# Patient Record
Sex: FEMALE | Race: WHITE | NOT HISPANIC OR LATINO | Employment: UNEMPLOYED | URBAN - METROPOLITAN AREA
[De-identification: names, ages, dates, MRNs, and addresses within clinical notes are randomized per-mention and may not be internally consistent; named-entity substitution may affect disease eponyms.]

---

## 2019-12-30 ENCOUNTER — AMBULATORY - HEALTHEAST (OUTPATIENT)
Dept: MATERNAL FETAL MEDICINE | Facility: HOSPITAL | Age: 34
End: 2019-12-30

## 2019-12-30 DIAGNOSIS — O26.90 PREGNANCY, ANTEPARTUM, COMPLICATIONS: ICD-10-CM

## 2019-12-31 ENCOUNTER — RECORDS - HEALTHEAST (OUTPATIENT)
Dept: ADMINISTRATIVE | Facility: OTHER | Age: 34
End: 2019-12-31

## 2020-01-02 ENCOUNTER — OFFICE VISIT - HEALTHEAST (OUTPATIENT)
Dept: CARDIOLOGY | Facility: CLINIC | Age: 35
End: 2020-01-02

## 2020-01-02 ENCOUNTER — AMBULATORY - HEALTHEAST (OUTPATIENT)
Dept: MATERNAL FETAL MEDICINE | Facility: HOSPITAL | Age: 35
End: 2020-01-02

## 2020-01-02 DIAGNOSIS — R55 VASOVAGAL SYNCOPE: ICD-10-CM

## 2020-01-02 RX ORDER — ACETAMINOPHEN 500 MG
1000 TABLET ORAL EVERY 6 HOURS PRN
Status: SHIPPED | COMMUNITY
Start: 2019-12-18

## 2020-01-02 ASSESSMENT — MIFFLIN-ST. JEOR: SCORE: 1666.58

## 2020-01-06 ENCOUNTER — OFFICE VISIT - HEALTHEAST (OUTPATIENT)
Dept: MATERNAL FETAL MEDICINE | Facility: HOSPITAL | Age: 35
End: 2020-01-06

## 2020-01-06 DIAGNOSIS — O26.90 PREGNANCY, ANTEPARTUM, COMPLICATIONS: ICD-10-CM

## 2020-01-06 DIAGNOSIS — O36.1110 ISOIMMUNIZATION FROM BLOOD GROUP INCOMPATIBILITY DURING PREGNANCY IN FIRST TRIMESTER, SINGLE OR UNSPECIFIED FETUS: ICD-10-CM

## 2020-01-06 DIAGNOSIS — Z82.79 FAMILY HISTORY OF CONGENITAL HEART DISORDER IN BROTHER: ICD-10-CM

## 2020-01-17 ENCOUNTER — HOSPITAL ENCOUNTER (OUTPATIENT)
Dept: CARDIOLOGY | Facility: HOSPITAL | Age: 35
Discharge: HOME | End: 2020-01-17
Attending: INTERNAL MEDICINE

## 2020-01-17 DIAGNOSIS — R55 VASOVAGAL SYNCOPE: ICD-10-CM

## 2020-01-17 LAB
AORTIC ROOT: 3 CM
AORTIC VALVE MEAN VELOCITY: 70 CM/S
AV DIMENSIONLESS INDEX VTI: 1
AV MEAN GRADIENT: 2 MMHG
AV PEAK GRADIENT: 3.2 MMHG
AV VALVE AREA: 3.1 CM2
AV VELOCITY RATIO: 1
BSA FOR ECHO PROCEDURE: 2.11 M2
CV ECHO HEIGHT: 65 IN
CV ECHO WEIGHT: 214 LBS
DOP CALC AO PEAK VEL: 89.2 CM/S
DOP CALC AO VTI: 16.9 CM
DOP CALC LVOT AREA: 3.14 CM2
DOP CALC LVOT DIAMETER: 2 CM
DOP CALC LVOT PEAK VEL: 86.5 CM/S
DOP CALC LVOT STROKE VOLUME: 53.1 CM3
DOP CALC MV VTI: 19.3 CM
DOP CALCLVOT PEAK VEL VTI: 16.9 CM
EJECTION FRACTION: 61 % (ref 55–75)
FRACTIONAL SHORTENING: 29.8 % (ref 28–44)
INTERVENTRICULAR SEPTUM IN END DIASTOLE: 1.08 CM (ref 0.6–0.9)
IVS/PW RATIO: 0.9
LA AREA 1: 14.9 CM2
LA AREA 2: 14.5 CM2
LEFT ATRIUM LENGTH: 4.1 CM
LEFT ATRIUM SIZE: 3.4 CM
LEFT ATRIUM VOLUME INDEX: 21.2 ML/M2
LEFT ATRIUM VOLUME: 44.8 ML
LEFT VENTRICLE CARDIAC INDEX: 2 L/MIN/M2
LEFT VENTRICLE CARDIAC OUTPUT: 4.2 L/MIN
LEFT VENTRICLE DIASTOLIC VOLUME INDEX: 26.6 CM3/M2 (ref 29–61)
LEFT VENTRICLE DIASTOLIC VOLUME: 56.1 CM3 (ref 46–106)
LEFT VENTRICLE HEART RATE: 80 BPM
LEFT VENTRICLE MASS INDEX: 70.4 G/M2
LEFT VENTRICLE SYSTOLIC VOLUME INDEX: 10.4 CM3/M2 (ref 8–24)
LEFT VENTRICLE SYSTOLIC VOLUME: 22 CM3 (ref 14–42)
LEFT VENTRICULAR INTERNAL DIMENSION IN DIASTOLE: 4 CM (ref 3.8–5.2)
LEFT VENTRICULAR INTERNAL DIMENSION IN SYSTOLE: 2.81 CM (ref 2.2–3.5)
LEFT VENTRICULAR MASS: 148.5 G
LEFT VENTRICULAR OUTFLOW TRACT MEAN GRADIENT: 2 MMHG
LEFT VENTRICULAR OUTFLOW TRACT MEAN VELOCITY: 62.9 CM/S
LEFT VENTRICULAR OUTFLOW TRACT PEAK GRADIENT: 3 MMHG
LEFT VENTRICULAR POSTERIOR WALL IN END DIASTOLE: 1.15 CM (ref 0.6–0.9)
LV STROKE VOLUME INDEX: 25.1 ML/M2
MITRAL VALVE DECELERATION SLOPE: 8560 MM/S2
MITRAL VALVE E/A RATIO: 1
MITRAL VALVE MEAN INFLOW VELOCITY: 50.9 CM/S
MITRAL VALVE PEAK VELOCITY: 97.6 CM/S
MITRAL VALVE PRESSURE HALF-TIME: 42 MS
MV AREA VTI: 2.75 CM2
MV AVERAGE E/E' RATIO: 8 CM/S
MV DECELERATION TIME: 165 MS
MV E'TISSUE VEL-LAT: 12.5 CM/S
MV E'TISSUE VEL-MED: 7.25 CM/S
MV LATERAL E/E' RATIO: 6.3
MV MEAN GRADIENT: 1 MMHG
MV MEDIAL E/E' RATIO: 10.9
MV PEAK A VELOCITY: 76.2 CM/S
MV PEAK E VELOCITY: 79.1 CM/S
MV PEAK GRADIENT: 3.8 MMHG
MV VALVE AREA BY CONTINUITY EQUATION: 2.7 CM2
MV VALVE AREA PRESSURE 1/2 METHOD: 5.2 CM2
NUC REST DIASTOLIC VOLUME INDEX: 3424 LBS
NUC REST SYSTOLIC VOLUME INDEX: 65 IN
TRICUSPID VALVE ANULAR PLANE SYSTOLIC EXCURSION: 2.2 CM

## 2020-01-17 ASSESSMENT — MIFFLIN-ST. JEOR: SCORE: 1666.58

## 2020-04-13 ENCOUNTER — AMBULATORY - HEALTHEAST (OUTPATIENT)
Dept: MATERNAL FETAL MEDICINE | Facility: HOSPITAL | Age: 35
End: 2020-04-13

## 2020-04-13 DIAGNOSIS — O26.90 PREGNANCY, ANTEPARTUM, COMPLICATIONS: ICD-10-CM

## 2020-04-17 ENCOUNTER — RECORDS - HEALTHEAST (OUTPATIENT)
Dept: ADMINISTRATIVE | Facility: OTHER | Age: 35
End: 2020-04-17

## 2020-04-17 ENCOUNTER — RECORDS - HEALTHEAST (OUTPATIENT)
Dept: ULTRASOUND IMAGING | Facility: HOSPITAL | Age: 35
End: 2020-04-17

## 2020-04-17 ENCOUNTER — OFFICE VISIT - HEALTHEAST (OUTPATIENT)
Dept: MATERNAL FETAL MEDICINE | Facility: HOSPITAL | Age: 35
End: 2020-04-17

## 2020-04-17 DIAGNOSIS — O26.90 PREGNANCY RELATED CONDITIONS, UNSPECIFIED, UNSPECIFIED TRIMESTER: ICD-10-CM

## 2020-04-17 DIAGNOSIS — O36.1110 ISOIMMUNIZATION FROM BLOOD GROUP INCOMPATIBILITY DURING PREGNANCY IN FIRST TRIMESTER, SINGLE OR UNSPECIFIED FETUS: ICD-10-CM

## 2021-06-04 VITALS — HEIGHT: 65 IN | BODY MASS INDEX: 35.65 KG/M2 | WEIGHT: 214 LBS

## 2021-06-04 VITALS
RESPIRATION RATE: 14 BRPM | DIASTOLIC BLOOD PRESSURE: 68 MMHG | HEIGHT: 65 IN | HEART RATE: 94 BPM | BODY MASS INDEX: 35.65 KG/M2 | WEIGHT: 214 LBS | SYSTOLIC BLOOD PRESSURE: 94 MMHG

## 2021-06-05 NOTE — PROGRESS NOTES
[facility] [place]  Genetic Counseling Consult    Patient: Kathy Katz YOB: 1985   Date of Service: 2020      Kathy Katz was seen at [facility] [place] for genetic consultation to discuss anti-c antibodies. She was accompanied by her , Jamshid, and child, Chanda.       Impression/Plan:   1. Kathy had a maternal fetal medicine consultation with Dr. Trinidad to discuss the anti-c antibodies. Recommendations were made to track Kathy's titer, which previously was low. Please see Dr. Trinidad's consultation note for more details.     2. Kathy had a genetic counseling session only to discuss the anti-c antibodies detected. We discussed the option of testing Jamshid for the C/c antigen. Kathy and Jamshid declined this testing.      3. Maternal serum AFP (single marker screen) is recommended after 15 weeks to screen for open neural tube defects.     4. Given Kathy's family history of congenital heart defects, a level II comprehensive ultrasound and fetal echocardiogram are recommended. These recommendations will stand regardless of Kathy's echocardiogram results.     Pregnancy History:   /Parity:    Age at Delivery: 35 y.o.  PREETI: 2020, by Ultrasound  Gestational Age: 8w0d    Kathy tapia pregnancy history is significant for three full lterm pregnancies. The first pregnancy was a different father. Her two most recent pregnancies and this current pregnancy are fathered by Jamshid.     We spent some time today reviewing Kathy's anti-c antibodies detected on her routine prenatal antibody screen.  We reviewed what red blood antigens are, and we talked about that her lab testing showed that her body makes antibodies to the red blood cell c protein/antigen. We talked about that women most commonly develop anti-c  antibodies by exposure to the c protein/antigen either through a blood transfusion or through having a pregnancy  in which the fetus was antigen c-positive, because the father of the baby also has that blood antigen.    Kathy reports this is her fourth pregnancy. Kathy has never had a miscarriage or termination and experienced no bleeding during either pregnancy, per her report. Kathy does not has a history of blood transfusions.     We talked about that anti-c  antibodies can be an issue during pregnancy because these antibodies can cross the placenta and into the baby; if the baby had c protein on his/her red blood cells, the anti-c antibodies can attach to the baby's red blood cells and destroy them.  We discussed that this can be important because if too many red blood cells are destroyed, this can lead to clinically significant fetal anemia.  We briefly reviewed that fetal anemia can be a very severe problem for a baby in utero and that, because of this, additional testing and screening for fetal anemia may be recommended for women with anti-c antibodies.  We talked about that fetal therapy (such as intrauterine blood transfusion) is often offered to a woman whose fetus has severe anemia.    We went on to review that we would only expect these anti-c antibodies to cause a problem for a developing fetus if the fetus has c antigen on his/her red blood cells.  We talked about that Kathy does not have c protein/antigen on her red blood cells since her body produced antibodies against them, and so she likely does not carry a gene for the c protein/antigen.  The only way for her fetus to have c protein/antigen is if the father of her baby, Jamshid, has the gene for c antigen and the baby inherited that gene as well.     We reviewed that antigen typing could be done in Kathy's , Jamshid, to see if he had c on his red blood cells.  We talked about that if he is negative for c, then we would not expect the fetus to be at risk (since the baby wouldn't be expected to have c protein in this circumstance).  If Jamshid has two copies of c  than there is a 100% chance the fetus will also have the c antigen. However, if Jamshid has one copy of c (and one copy of C) than there is a 50% chance the fetus will have the antigen. It is important to determine this to assess risks for future pregnancies. In the case of a 50% chance, an amniocentesis is an option to determine the baby's blood antigens.    Given Kathy's current low titer, and after her discussion with Dr. Trinidad, she declined blood antigen typing for Jamshid. We discussed if her titer changes that may likely change recommendations. At that point, Jamshid could still be tested. We discussed the most likely explanation is that Jamshid is homozygous (C/c) since she only has this antibody after her second pregnancy with Jamshid. Therefore, the pregnancy would be at 50/50 risk and Kathy declines amniocentesis.     Medical History:   Kathy s reported medical history is not expected to impact pregnancy management or risks to fetal development.       Family History:   A three-generation pedigree was obtained, and is scanned under the  Media  tab.   The following significant findings were reported by Kathy:    The father of the pregnancy, Jamshid, 35, is healthy.     Kathy has a son, Keith,14, from a previous relationship. He is healthy    Kathy and Jamshid have one son, Kaz,5, and one daughter, Chanda,1. Both are healthy      Kathy has a significant family history of congenital heart defects     Kathy's brother has a bicuspid aortic valve    Kathy's mother had a daughter  (maternal half-sister to Kathy) that  around one year of age. Kathy knows little details since her mother does not speak of this child often. However, Kathy was told the infant had a congenital heart defect and a cleft lip and/or palate.     Of note, Kathy has had syncope episodes recently and has a maternal echocardiogram scheduled for  given her brother's heart defect.       Congenital heart defects can be isolated or associated with multiple birth  defects (syndromic).There are many genetic syndromes, single gene disorders or chromosomal abnormalities, that can be associated with congenital heart defects. Isolated congenital heart defects are usually a multi-factorial condition caused by the combination of genetic and environmental factors and familial clustering is not uncommon. Since there is a genetic component to multi-factorial conditions, the recurrence risk is higher for first degree relatives (siblings) than in second degree relatives and decreases with distance in relationship.    We discussed that congenital heart defects are common, occurring in 5 to 10 per 1000 live births. The specific recurrence risk for first degree relatives differs according to the type of congenital heart defect and if there is an associated genetic syndrome present. In general, studies show that the overall risk contributed by a positive family history of a congenital heart defect in 1st degree relatives for the same defect is  8.15% whereas the recurrence risk for a different heart defect is 2.68%. There are also recurrence risks specific to the heart defect. Often risks for second degree relatives are not available. Furthermore, for third degree relatives the risk is likely equal to general population risks.     This pregnancy is a second degree relative to the family member with a bicupsid aortic valve; therefore, the recurrence risk is likely increased. We discussed that recurrence risks are less available for bicupsid aortic valve but one estimate is 9.4% for first degree relatives.     A fetal echocardiogram is often recommended for pregnancies of a second or first degree relation to a family member with a congenital heart defect. Fetal echocardiograms can also be recommended, in the absence of family history, due to maternal diabetes, an IVF pregnancy, or suspected heart defect on comprehensive ultrasound, among other reasons.       Otherwise, the reported family  history is negative for multiple miscarriages, stillbirths, birth defects, mental retardation, known genetic conditions, and consanguinity.       Carrier Screening:   Carrier screening was not discussed today.       Risk Assessment for Chromosome Conditions:   We explained that the risk for fetal chromosome abnormalities increases with maternal age. We discussed specific features of common chromosome abnormalities, including Down syndrome, trisomy 13, trisomy 18, and sex chromosome trisomies.      At age 35 at midtrimester, the risk to have a baby with Down syndrome is 1 in 342.     At age 35 at midtrimester, the risk to have a baby with any chromosome abnormality is 1 in  172.     Testing Options:   We discussed the following options:   Non-invasive Prenatal Testing (NIPT)    Maternal plasma cell-free DNA testing; first trimester ultrasound with nuchal translucency and nasal bone assessment is recommended, when appropriate    Screens for fetal trisomy 21, trisomy 13, trisomy 18, and sex chromosome aneuploidy    Cannot screen for open neural tube defects; maternal serum AFP after 15 weeks is recommended      Genetic Amniocentesis    Invasive procedure typically performed in the second trimester by which amniotic fluid is obtained for the purpose of chromosome analysis and/or other prenatal genetic analysis    Diagnostic results; >99% sensitivity for fetal chromosome abnormalities    AFAFP measurement tests for open neural tube defects      Comprehensive (Level II) ultrasound: Detailed ultrasound performed between 18-22 weeks gestation to screen for major birth defects and markers for aneuploidy.    We briefly discussed screening options. However, Kathy is too early in the pregnancy to pursue screening. She plans to have NIPT at her primary office.     We reviewed the benefits and limitations of this testing.  Screening tests provide a risk assessment specific to the pregnancy for certain fetal chromosome  abnormalities, but cannot definitively diagnose or exclude a fetal chromosome abnormality.  Follow-up genetic counseling and consideration of diagnostic testing is recommended with any abnormal screening result.      It was a pleasure to be involved with Kathy s care. Face-to-face time of the meeting was 30 minutes.    Tsering Gayle MS, [facility]  Licensed Genetic Counselor   [facility]  Maternal Fetal Medicine  [email or web address]  Office: [phone]  Pager [phone]  [place] Office: [phone]  [facility]: [phone]   Fax: [phone]

## 2021-06-05 NOTE — PROGRESS NOTES
RE: Kathy Katz  : 1985  MRUN: [id number]    2020    Dear Ms. Olga Lidia CNM,    Thank you for referring your patient Ms. Katz for a Maternal-Fetal Medicine consultation today.  As you know, she is a 34 y.o.  at 8 weeks and 0 days gestation with an Estimated Date of Delivery: 20      She came to me today to discuss recommendations as she was noted to have an anti-c antibody on her new OB labs.  A titer has been obtained and is < 1:1.  She has had 3 prior term deliveries (2 vaginal deliveries complicated by a 4th degree and 3rd degree laceration respectively followed by an elective primary ) and has not had any antibodies in those pregnancies.  The two most recent children were fathered by her current partner, however her first pregnancy was with a different father.  She has never had a blood transfusion.  Her current partner has not been tested for the c antigen.    Of note, the patient also has been having fainting episodes.  Her brother was recently diagnosed with a bicuspid aortic valve.  She has a cardiology appointment this week for evaluation and echocardiogram given this history.    Problems Complicating Pregnancy:  Anti-c alloimmunization    Obstetrical History:    G1:  ;  at term of [id number] male with 4th degree laceration  G2:  :   of 8lb 6 oz male with 3rd degree laceration  G3:  2018; primary  for 7 lb 6 oz female  G4:  current    Medical History:   Fainting spells  History of frequent UTI    Surgical History:   2018  delivery  Wrist surgery in childhood    Medications:    Tylenol  Vitamin B12  Prenatal vitamin    Allergies:   Metronidazole    Social History:  She reports that she has quit smoking. Her smoking use included cigarettes. She does not have any smokeless tobacco history on file. She reports previous alcohol use. She reports that she does not use drugs.    Family History:   Brother with bicuspid aortic valve    Review of  Systems:  Negative per HPI    Data Reviewed:    o ABO Group: O, Rh type: po, antibody screen: pos for anti-c  o Hemoglobin 13.6 mg/dL, hematocrit 39.8 %, MCV 81.9 fL, platelets 284 thou/ L  o VDRL/HepBsAg/HIV: neg/neg/neg  o Rubella/Varicella IgG: immune/immune  o Hemoglobin A1C: 5.4 %    The remaining prenatal laboratory results are not available for the review during the consultation.    Physical examination was deferred at this time.    In light of the patient s history as listed above my recommendations can be summarized briefly as follows:    1. 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  As the patient has never had a blood transfusion, and has not had this issue with her prior pregnancies, I suspect she was alloimmunized with her last pregnancy.  This is the same partner as that pregnancy, and very likely he is heterozygous for the c antigen.  I did offer testing for him, though this will not  without an amniocentesis to confirm fetal antigen type.  As the parents do not desire an amnio, they have decided to forego paternal testing.  I have recommended serial titers every 4 weeks.  If the titer were to rise by 4 fold (to 1:4), evaluation with middle cerebral artery Dopplers is indicated due to risk for fetal anemia.  We discussed this process and the timing of this test.  We also discussed the potential for percutaneous umbilical cord blood sampling with intrauterine transfusion.  In the absence of a critical titer, delivery can occur based on usual obstetric indications.      2.  Family history of congenital cardiac anomaly    The patient was encouraged to keep her appointment with cardiology, particularly in the setting of her fainting spells.  She reports she also has a plan to wear a Holter monitor following this visit.  Should any new findings come up from this appointment, we would be happy to see her  back in clinic for further consultation.  A comprehensive anatomic survey and fetal echocardiogram is recommended at 18-20 weeks and 20-22 weeks respectively.    Summary of recommendations:  1. Repeat maternal antibody titers every 4 weeks  2. If the titer is increased to 1:4 or greater, begin middle cerebral artery Doppler evaluation for fetal anemia in our office at that time  3. Comprehensive anatomic survey at 18-20 weeks  4. Fetal echocardiogram at 20-22 weeks     At the end of our discussion, Ms. Katz indicated that her questions were answered and she seemed satisfied with our discussion.  Thank you for the opportunity to participate in your patient s care.  If I can be of any further assistance, please do not hesitate to contact me.      Eugenia Trinidad MD  Maternal Fetal Medicine    I spent a total of 20 minutes face to face with Kathy Katz during today's visit.  Over 50% of this time was spent in counseling the patient and/or coordinating care.

## 2021-06-05 NOTE — PROGRESS NOTES
RE: Kathy Katz  : 1985  MRUN: [id number]     2020     Dear Ms. Olga Lidia CNM,     Thank you for referring your patient Ms. Katz for a Maternal-Fetal Medicine consultation today.  As you know, she is a 34 y.o.  at 8 weeks and 0 days gestation with an Estimated Date of Delivery: 20       She came to me today to discuss recommendations as she was noted to have an anti-c antibody on her new OB labs.  A titer has been obtained and is < 1:1.  She has had 3 prior term deliveries (2 vaginal deliveries complicated by a 4th degree and 3rd degree laceration respectively followed by an elective primary ) and has not had any antibodies in those pregnancies.  The two most recent children were fathered by her current partner, however her first pregnancy was with a different father.  She has never had a blood transfusion.  Her current partner has not been tested for the c antigen.     Of note, the patient also has been having fainting episodes.  Her brother was recently diagnosed with a bicuspid aortic valve.  She has a cardiology appointment this week for evaluation and echocardiogram given this history.     Problems Complicating Pregnancy:  Anti-c alloimmunization     Obstetrical History:    G1:  ;  at term of [id number] male with 4th degree laceration  G2:  :   of 8lb 6 oz male with 3rd degree laceration  G3:  2018; primary  for 7 lb 6 oz female  G4:  current     Medical History:   Fainting spells  History of frequent UTI     Surgical History:   2018  delivery  Wrist surgery in childhood     Medications:    Tylenol  Vitamin B12  Prenatal vitamin     Allergies:   Metronidazole     Social History:  She reports that she has quit smoking. Her smoking use included cigarettes. She does not have any smokeless tobacco history on file. She reports previous alcohol use. She reports that she does not use drugs.     Family History:   Brother with bicuspid aortic  valve     Review of Systems:  Negative per HPI     Data Reviewed:                                                                           ? ABO Group: O, Rh type: po, antibody screen: pos for anti-c  ? Hemoglobin 13.6 mg/dL, hematocrit 39.8 %, MCV 81.9 fL, platelets 284 thou/ L  ? VDRL/HepBsAg/HIV: neg/neg/neg  ? Rubella/Varicella IgG: immune/immune  ? Hemoglobin A1C: 5.4 %     The remaining prenatal laboratory results are not available for the review during the consultation.     Physical examination was deferred at this time.     In light of the patient s history as listed above my recommendations can be summarized briefly as follows:     1. 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  As the patient has never had a blood transfusion, and has not had this issue with her prior pregnancies, I suspect she was alloimmunized with her last pregnancy.  This is the same partner as that pregnancy, and very likely he is heterozygous for the c antigen.  I did offer testing for him, though this will not  without an amniocentesis to confirm fetal antigen type.  As the parents do not desire an amnio, they have decided to forego paternal testing.  I have recommended serial titers every 4 weeks.  If the titer were to rise by 4 fold (to 1:4), evaluation with middle cerebral artery Dopplers is indicated due to risk for fetal anemia.  We discussed this process and the timing of this test.  We also discussed the potential for percutaneous umbilical cord blood sampling with intrauterine transfusion.  In the absence of a critical titer, delivery can occur based on usual obstetric indications.       2.  Family history of congenital cardiac anomaly     The patient was encouraged to keep her appointment with cardiology, particularly in the setting of her fainting spells.  She reports she also has a plan to wear a Holter monitor following  this visit.  Should any new findings come up from this appointment, we would be happy to see her back in clinic for further consultation.  A comprehensive anatomic survey and fetal echocardiogram is recommended at 18-20 weeks and 20-22 weeks respectively.     Summary of recommendations:  1. Repeat maternal antibody titers every 4 weeks  2. If the titer is increased to 1:4 or greater, begin middle cerebral artery Doppler evaluation for fetal anemia in our office at that time  3. Comprehensive anatomic survey at 18-20 weeks  4. Fetal echocardiogram at 20-22 weeks                                                                                                                               At the end of our discussion, Ms. Katz indicated that her questions were answered and she seemed satisfied with our discussion.  Thank you for the opportunity to participate in your patient s care.  If I can be of any further assistance, please do not hesitate to contact me.      Eugenia Trinidad MD  Maternal Fetal Medicine     I spent a total of 20 minutes face to face with Kathy Katz during today's visit.  Over 50% of this time was spent in counseling the patient and/or coordinating care.

## 2021-06-07 NOTE — PROGRESS NOTES
"Please see \"Imaging\" tab under Chart Review for full report.  This ultrasound was performed in the [facility], and may be located under Care Everywhere.    Eugenia Trinidad MD  Maternal Fetal Medicine    "

## 2021-06-28 NOTE — PROGRESS NOTES
Progress Notes by Diana Mahmood MD at 1/2/2020 10:20 AM     Author: Diana Mahmood MD Service: -- Author Type: Physician    Filed: 1/2/2020 10:50 AM Encounter Date: 1/2/2020 Status: Signed    : Diana Mahmood MD (Physician)             [facility] Note    Thank you, Dr. Abarca, for asking the [facility] team to see Kathy Katz to evaluate syncope.    Assessment:    Kathy Katz is a 34 y.o. old female with a PMhx significant for insulin resistance, PCOS and insulin dependent gestational diabetes and ongoing use of Metformin who presents to [facility] Care clinic for further assessment of sybcope        ECG: Personally reviewed. normal EKG, normal sinus rhythm, unchanged from previous tracings.    ECHO: ordered      Plan:  - MAURICIO monitor  - Echocardiogram to evaluate cardiac structure and function    ______________________________________________________________________    Subjective:  CC: Today, Mrs. Katz reports that she has been experiencing infrequent episodes of syncope for the past 12 years with a frequency of every few years. However, she has noticed a recent increase in frequency to 2-3 times per year. She reports that the episodes are associated with nausea or when she uses the bathroom (diarrhea). She recalls the most recent episode as 2 weeks ago at which time she suffered a concussion from her fall. She reports that she has never experienced any symptoms with exertion and is able to play sports without incident. She denies any associated symptoms of chest pain, lightheadedness, palpitations. She denies any HF symptoms of orthopnea, PND or edema.     ______________________________________________________________________      Review of Systems:   A complete 10 systems ROS was reviewed  And is negative except what is listed in the HPI.     Problem List:  Syncope    Medical History:  Past Medical History:   Diagnosis Date   ? Gestational  diabetes    ? PCOS (polycystic ovarian syndrome)         Surgical History:  Past Surgical History:   Procedure Laterality Date   ?  SECTION            Social History:  Social History     Socioeconomic History   ? Marital status:      Spouse name: Not on file   ? Number of children: Not on file   ? Years of education: Not on file   ? Highest education level: Not on file   Occupational History   ? Not on file   Social Needs   ? Financial resource strain: Not on file   ? Food insecurity:     Worry: Not on file     Inability: Not on file   ? Transportation needs:     Medical: Not on file     Non-medical: Not on file   Tobacco Use   ? Smoking status: Not on file   Substance and Sexual Activity   ? Alcohol use: Not on file   ? Drug use: Not on file   ? Sexual activity: Not on file   Lifestyle   ? Physical activity:     Days per week: Not on file     Minutes per session: Not on file   ? Stress: Not on file   Relationships   ? Social connections:     Talks on phone: Not on file     Gets together: Not on file     Attends Jewish service: Not on file     Active member of club or organization: Not on file     Attends meetings of clubs or organizations: Not on file     Relationship status: Not on file   ? Intimate partner violence:     Fear of current or ex partner: Not on file     Emotionally abused: Not on file     Physically abused: Not on file     Forced sexual activity: Not on file   Other Topics Concern   ? Not on file   Social History Narrative   ? Not on file           Family History:  Family History   Problem Relation Age of Onset   ? Valvular heart disease Brother    ? Heart attack Paternal Grandfather    ? Heart attack Maternal Grandfather          Allergies:  Allergies   Allergen Reactions   ? Metronidazole Nausea And Vomiting       Medications:  Current Outpatient Medications   Medication Sig Dispense Refill   ? acetaminophen (TYLENOL) 500 MG tablet Take 1,000 mg by mouth every 6 (six) hours as  "needed.     ? metFORMIN (GLUCOPHAGE) 500 MG tablet Take 500 mg by mouth 2 (two) times a day.     ? prenatal 25/iron fum/folic/dha (PRENATAL-1 ORAL) Take 4 Gum by mouth daily.       No current facility-administered medications for this visit.        Objective:   Vital signs:  BP 94/68 (Patient Site: Left Arm, Patient Position: Sitting, Cuff Size: Adult Regular)   Pulse 94   Resp 14   Ht 5' 5\" (1.651 m)   Wt 214 lb (97.1 kg)   BMI 35.61 kg/m        Physical Exam:    GENERAL APPEARANCE: Alert, cooperative and in no acute distress.   HEENT: No scleral icterus. Oral mucuos membranes pink and moist.   NECK: no JVD. No Hepatojugular reflux. Thyroid not visualized. No lymphadenopathy   CHEST: clear to auscultation   CARDIOVASCULAR: S1, S2 without murmur ,clicks or rubs. Brachial, radial and posterior tibial pulses are intact and symetric. No carotid bruits noted. No edema  ABDOMEN: Nontender. BS+. No bruits.   SKIN: No Xanthelasma   Musculoskeletal: No cyanosis, clubbing or swelling.      Lab Results:  LIPIDS:  No results found for: CHOL  No results found for: HDL  No results found for: LDLCALC  No results found for: TRIG  No components found for: CHOLHDL    BMP:  No results found for: CREATININE, BUN, NA, K, CL, CO2      Марина Mahmood MD., S  [facility] HEART [facility]           "

## 2025-05-15 ENCOUNTER — LAB (OUTPATIENT)
Dept: LAB | Facility: CLINIC | Age: 40
End: 2025-05-15
Payer: COMMERCIAL

## 2025-05-15 DIAGNOSIS — N92.1 MENOMETRORRHAGIA: ICD-10-CM

## 2025-05-15 LAB
ESTRADIOL SERPL-MCNC: 69 PG/ML
FSH SERPL IRP2-ACNC: 2.9 MIU/ML
MIS SERPL-MCNC: 6.88 NG/ML (ref 0.15–7.5)
PROGEST SERPL-MCNC: 12.3 NG/ML
TSH SERPL DL<=0.005 MIU/L-ACNC: 1.83 UIU/ML (ref 0.3–4.2)